# Patient Record
Sex: MALE | Race: WHITE | URBAN - METROPOLITAN AREA
[De-identification: names, ages, dates, MRNs, and addresses within clinical notes are randomized per-mention and may not be internally consistent; named-entity substitution may affect disease eponyms.]

---

## 2017-03-02 ENCOUNTER — OFFICE VISIT (OUTPATIENT)
Dept: CARDIOLOGY | Facility: CLINIC | Age: 27
End: 2017-03-02
Payer: COMMERCIAL

## 2017-03-02 ENCOUNTER — RADIANT APPOINTMENT (OUTPATIENT)
Dept: CARDIOLOGY | Facility: CLINIC | Age: 27
End: 2017-03-02
Attending: PEDIATRICS
Payer: COMMERCIAL

## 2017-03-02 VITALS
DIASTOLIC BLOOD PRESSURE: 80 MMHG | HEIGHT: 69 IN | SYSTOLIC BLOOD PRESSURE: 114 MMHG | RESPIRATION RATE: 16 BRPM | BODY MASS INDEX: 22.76 KG/M2 | WEIGHT: 153.66 LBS | HEART RATE: 81 BPM | OXYGEN SATURATION: 100 %

## 2017-03-02 DIAGNOSIS — Q21.11 OSTIUM SECUNDUM TYPE ATRIAL SEPTAL DEFECT: Primary | ICD-10-CM

## 2017-03-02 DIAGNOSIS — Q21.11 OSTIUM SECUNDUM TYPE ATRIAL SEPTAL DEFECT: ICD-10-CM

## 2017-03-02 PROCEDURE — 99202 OFFICE O/P NEW SF 15 MIN: CPT | Mod: 25 | Performed by: PEDIATRICS

## 2017-03-02 PROCEDURE — 93303 ECHO TRANSTHORACIC: CPT

## 2017-03-02 PROCEDURE — 93325 DOPPLER ECHO COLOR FLOW MAPG: CPT

## 2017-03-02 PROCEDURE — 93320 DOPPLER ECHO COMPLETE: CPT

## 2017-03-02 NOTE — NURSING NOTE
"Noah Gomez's goals for this visit include: follow up ostium secundum type artial septal  He requests these members of his care team be copied on today's visit information: yes    PCP: Mohini Torres    Referring Provider:  Referred Self, MD  No address on file    Chief Complaint   Patient presents with     Heart Problem     ostium secundum type atrial septal defect        Initial /80 (BP Location: Right arm, Patient Position: Chair, Cuff Size: Adult Regular)  Pulse 81  Ht 1.741 m (5' 8.54\")  Wt 69.7 kg (153 lb 10.6 oz)  BMI 23 kg/m2 Estimated body mass index is 23 kg/(m^2) as calculated from the following:    Height as of this encounter: 1.741 m (5' 8.54\").    Weight as of this encounter: 69.7 kg (153 lb 10.6 oz).  Medication Reconciliation: complete  "

## 2017-03-02 NOTE — MR AVS SNAPSHOT
After Visit Summary   3/2/2017    Noah Gomez    MRN: [id number]           Patient Information     Date Of Birth          1990        Visit Information        Provider Department      3/2/2017 4:40 PM Adam Montgomery MD [facility]        Today's Diagnoses     Ostium secundum type atrial septal defect    -  1      Care Instructions    Thank you for choosing [facility]. It was a pleasure to see you for your office visit today.     To reach our Specialty Clinic: [phone]  To reach our Imaging scheduler: [phone]      If you had any blood work, imaging or other tests:  Normal test results will be mailed to your home address in a letter  Abnormal results will be communicated to you via phone call/letter  Please allow up to 1-2 weeks for processing/interpretation of most lab work  If you have questions or concerns call our clinic at [phone]          Follow-ups after your visit        Follow-up notes from your care team     Return in about 5 years (around 3/2/2022).      Your next 10 appointments already scheduled     Mar 02, 2017  4:40 PM CST   New Visit with Adam Montgomery MD   [facility] ([facility]    [address] [place] [id number] [phone]              Who to contact     If you have questions or need follow up information about today's clinic visit or your schedule please contact [facility] directly at [phone].  Normal or non-critical lab and imaging results will be communicated to you by MyChart, letter or phone within 4 business days after the clinic has received the results. If you do not hear from us within 7 days, please contact the clinic through MyChart or phone. If you have a critical or abnormal lab result, we will notify you by phone as soon as possible.  Submit refill requests through [employer] or call your pharmacy and they will forward the refill  "request to us. Please allow 3 business days for your refill to be completed.          Additional Information About Your Visit        [facility] Information     [facility] is an electronic gateway that provides easy, online access to your medical records. With [facility], you can request a clinic appointment, read your test results, renew a prescription or communicate with your care team.     To sign up for [facility] [employer] visit the website at [email or web address]   You will be asked to enter the access code listed below, as well as some personal information. Please follow the directions to create your username and password.     Your access code is: [email or web address]  Expires: 2017  4:35 PM     Your access code will  in 90 days. If you need help or a new code, please contact your [facility] or call [phone] for assistance.        Care EveryWhere ID     This is your Care EveryWhere ID. This could be used by other organizations to access your [facility] medical records  [id number]        Your Vitals Were     Pulse Respirations Height Pulse Oximetry BMI (Body Mass Index)       81 16 5' 8.54\" (1.741 m) 100% 23 kg/m2        Blood Pressure from Last 3 Encounters:   17 114/80   13 128/72    Weight from Last 3 Encounters:   17 153 lb 10.6 oz (69.7 kg)   13 154 lb 15.7 oz (70.3 kg)     * Growth percentiles are based on CDC 2-20 Years data.              Today, you had the following     No orders found for display       Primary Care Provider Office Phone # Fax #    Mohini Torres [phone]       [address] [address]        Thank you!     Thank you for choosing [facility]  for your care. Our goal is always to provide you with excellent care. Hearing back from our patients is one way we can continue to improve our services. Please take a few minutes to complete the written survey that you may receive " in the mail after your visit with us. Thank you!             Your Updated Medication List - Protect others around you: Learn how to safely use, store and throw away your medicines at www.disposemymeds.org.      Notice  As of 3/2/2017  4:35 PM    You have not been prescribed any medications.

## 2017-03-02 NOTE — PATIENT INSTRUCTIONS
Thank you for choosing [facility]. It was a pleasure to see you for your office visit today.     To reach our Specialty Clinic: [phone]  To reach our Imaging scheduler: [phone]      If you had any blood work, imaging or other tests:  Normal test results will be mailed to your home address in a letter  Abnormal results will be communicated to you via phone call/letter  Please allow up to 1-2 weeks for processing/interpretation of most lab work  If you have questions or concerns call our clinic at [phone]

## 2017-03-02 NOTE — PROGRESS NOTES
"                                               PEDS Cardiac Consult Letter  Date: 3/2/2017      Mohini Torres MD  [facility] and [address] [address]      PATIENT: Noah Gomez  :          1990   BETSY:          3/2/2017    Dear Dr. Torres:    Noah is 26 years old and was seen at the [facility] on 3/2/2017.   He had an 11 mm device placed to close an atrial septal defect on 05. I last saw him 4 years ago. Since that time he has done well. He works at the [employer] and travels frequently. His exercise tolerance is normal. He runs 3-5 miles a day during the summer. He is going back to school to study technology management with a Masters degree. He has not experienced any syncope or chest pain.    On physical examination his height was 5' 8.54\" (1.741 m) and his weight was 153 lb 10.6 oz (69.7 kg).  His heart rate was 81  and respirations 16 per minute.  The blood pressure in his right arm was 114/80.  He was acyanotic, warm and well perfused. He was alert cooperative and in no distress.  His lungs were clear to auscultation without respiratory distress.  He had a regular rhythm with . no murmur  The second heart sound was physiologically split with a normal pulmonary component.   There was no organomegaly or abdominal tenderness.  Peripheral pulses were 2+ and equal in all extremities.  There was no clubbing or edema.    An echocardiogram performed today that I personally reviewed and explained to him showed his device in good position. There was no residual leak and no pericardial effusion.    Noah has an excellent result from device closure of his atrial septal defect. He does not need any restriction of his activities. We discussed the symptoms of device erosion, but he is so far [place] that the chances of this happening her vanishingly small. He should return in 5 years for follow-up with an echocardiogram.      Thank you very " much for your confidence in allowing me to participate in Noah's care.  If you have any questions or concerns, please don't hesitate to contact me.    Sincerely,      Adma Montgomery M.D.   Pediatric Cardiology   [facility]  Pediatric Specialty Clinic  [phone]    Note: Chart documentation done in part with Dragon Voice Recognition software. Although reviewed after completion, some word and grammatical errors may remain.

## 2018-05-30 ENCOUNTER — TELEPHONE (OUTPATIENT)
Dept: CARDIOLOGY | Facility: CLINIC | Age: 28
End: 2018-05-30

## 2018-05-30 DIAGNOSIS — Q21.11 OSTIUM SECUNDUM TYPE ATRIAL SEPTAL DEFECT: Primary | ICD-10-CM

## 2018-05-30 DIAGNOSIS — R42 LIGHTHEADEDNESS: ICD-10-CM

## 2018-05-30 DIAGNOSIS — R00.0 RACING HEART BEAT: ICD-10-CM

## 2018-05-30 NOTE — TELEPHONE ENCOUNTER
[facility]    Phone Message: Noah  Phone: [phone]    May a detailed message be left on voicemail: yes    Reason for Call: Noah is having increased episodes of light headedness for the past 3 months or so.  He said Dr. Montgomery advised he should reach out and discuss if this happens.  Requesting a call.  Thank you.     Action Taken: Message routed to:  Pediatric Clinics: Cardiology p [phone]

## 2018-06-01 NOTE — TELEPHONE ENCOUNTER
"Patient reports he had an episode of lightheadedness in January and then in the past week or so has occurred multiple times. Describes lightheadedness as a \"cloudy feeling\". Episodes last about 20 minutes. Meditation techniques help. Reports feeling like his heart is racing. Has chest tightness. Denies shortness of breath, loss of consciousness, or fatigue. He thinks they could also be related to anxiety as he has had a \"crazy year\" finishing his Masters degree and starting full-time work, but he is not sure. Will review with Dr. Montgomery and get back to patient with recommendations. Patient agreeable to plan. Advised to seek care more urgently if he experiences loss of consciousness, symptoms worsen significantly, or if an episode is not resolving.     Dr. Montgomery recommended that patient do 14-day Zio. Discussed with patient who plans to do next week.   "

## 2018-06-22 NOTE — TELEPHONE ENCOUNTER
Two appointments have been scheduled for device application on 6/7/2018 and 6/15/2018, both appointments were then cancelled. LM with contact number to reschedule.